# Patient Record
Sex: MALE | Race: OTHER | HISPANIC OR LATINO | Employment: UNEMPLOYED | URBAN - METROPOLITAN AREA
[De-identification: names, ages, dates, MRNs, and addresses within clinical notes are randomized per-mention and may not be internally consistent; named-entity substitution may affect disease eponyms.]

---

## 2018-09-19 ENCOUNTER — HOSPITAL ENCOUNTER (EMERGENCY)
Facility: HOSPITAL | Age: 38
Discharge: HOME | End: 2018-09-19
Attending: EMERGENCY MEDICINE | Admitting: EMERGENCY MEDICINE

## 2018-09-19 VITALS
TEMPERATURE: 97.4 F | RESPIRATION RATE: 16 BRPM | WEIGHT: 150 LBS | DIASTOLIC BLOOD PRESSURE: 82 MMHG | OXYGEN SATURATION: 100 % | HEART RATE: 93 BPM | SYSTOLIC BLOOD PRESSURE: 135 MMHG

## 2018-09-19 DIAGNOSIS — T50.901A ACCIDENTAL OVERDOSE, INITIAL ENCOUNTER: Primary | ICD-10-CM

## 2018-09-19 DIAGNOSIS — F11.10 HEROIN ABUSE (HCC): ICD-10-CM

## 2018-09-19 LAB
AMPHETAMINES SERPL QL SCN: NEGATIVE
BARBITURATES UR QL: NEGATIVE
BENZODIAZ UR QL: NEGATIVE
COCAINE UR QL: NEGATIVE
METHADONE UR QL: NEGATIVE
OPIATES UR QL SCN: POSITIVE
PCP UR QL: NEGATIVE
THC UR QL: POSITIVE

## 2018-09-19 PROCEDURE — 99284 EMERGENCY DEPT VISIT MOD MDM: CPT

## 2018-09-19 PROCEDURE — 80307 DRUG TEST PRSMV CHEM ANLYZR: CPT | Performed by: EMERGENCY MEDICINE

## 2018-09-19 NOTE — ED PROVIDER NOTES
History  Chief Complaint   Patient presents with    Overdose - Accidental     found on street - not breathing with questionable pulse - cpr started by EMS - given  narcan 8mg intranasally by police - admits to heroin 1 bag injected - awake on arrival     62-year-old gentleman presents after an accidental overdose of heroin  Patient has an ongoing history of heroin abuse and went through detox recently  He is currently residing at [facility]  He admits to injecting one bag prior to being found unresponsive  He was given a total of 8 mg of Narcan before completely awakening  At this point he denies any problems, complaints, acute issues  Overdose - Accidental   Ingested substance:  Illicit drugs  Suspected agents: Heroin  Context: recreational    Associated symptoms: altered mental status, diaphoresis (After receiving Narcan) and unresponsiveness    Associated symptoms: no abdominal pain, no agitation, no anxiety, no chest pain, no cough, no diarrhea, no headaches, no lethargy, no nausea, no shortness of breath, no slurred speech and no vomiting        None       Past Medical History:   Diagnosis Date    Drug abuse        History reviewed  No pertinent surgical history  History reviewed  No pertinent family history  I have reviewed and agree with the history as documented  Social History   Substance Use Topics    Smoking status: Current Every Day Smoker     Packs/day: 1 00    Smokeless tobacco: Never Used    Alcohol use No        Review of Systems   Constitutional: Positive for diaphoresis (After receiving Narcan)  Negative for activity change, chills, fatigue and fever  HENT: Negative  Negative for congestion, postnasal drip, rhinorrhea, sinus pain, sore throat and trouble swallowing  Eyes: Negative  Respiratory: Negative  Negative for cough and shortness of breath  Cardiovascular: Negative for chest pain     Gastrointestinal: Negative for abdominal pain, constipation, diarrhea, nausea and vomiting  Endocrine: Negative  Genitourinary: Negative  Musculoskeletal: Negative  Negative for arthralgias, back pain and myalgias  Skin: Negative  Allergic/Immunologic: Negative  Neurological: Negative  Negative for headaches  Hematological: Negative  Psychiatric/Behavioral: Negative for agitation  Physical Exam  Physical Exam   Constitutional: He is oriented to person, place, and time  He appears well-developed and well-nourished  No distress  HENT:   Head: Normocephalic and atraumatic  Nose: Nose normal    Mouth/Throat: Oropharynx is clear and moist    Eyes: Conjunctivae are normal  Pupils are equal, round, and reactive to light  Neck: Neck supple  Cardiovascular: Normal rate, regular rhythm and normal heart sounds  Pulmonary/Chest: Effort normal and breath sounds normal  No respiratory distress  Abdominal: Soft  Bowel sounds are normal  He exhibits no distension  There is no tenderness  There is no guarding  Musculoskeletal: Normal range of motion  He exhibits no edema  Neurological: He is alert and oriented to person, place, and time  Skin: Skin is warm and dry  Capillary refill takes less than 2 seconds  He is not diaphoretic  Psychiatric: He has a normal mood and affect  His behavior is normal    Nursing note and vitals reviewed        Vital Signs  ED Triage Vitals [09/19/18 1330]   Temperature Pulse Respirations Blood Pressure SpO2   (!) 97 4 °F (36 3 °C) (!) 109 20 144/86 100 %      Temp Source Heart Rate Source Patient Position - Orthostatic VS BP Location FiO2 (%)   Temporal Monitor Sitting Left arm --      Pain Score       --           Vitals:    09/19/18 1330 09/19/18 1411   BP: 144/86 135/82   Pulse: (!) 109 93   Patient Position - Orthostatic VS: Sitting Sitting       Visual Acuity      ED Medications  Medications - No data to display    Diagnostic Studies  Results Reviewed     Procedure Component Value Units Date/Time    Rapid drug screen, urine [[id number]]  (Abnormal) Collected:  09/19/18 1350    Lab Status:  Final result Specimen:  Urine from Urine, Clean Catch Updated:  09/19/18 1435     Amph/Meth UR Negative     Barbiturate Ur Negative     Benzodiazepine Urine Negative     Cocaine Urine Negative     Methadone Urine Negative     Opiate Urine Positive (A)     PCP Ur Negative     THC Urine Positive (A)    Narrative:         Presumptive report  If requested, specimen will be sent to reference lab for confirmation  FOR MEDICAL PURPOSES ONLY  IF CONFIRMATION NEEDED PLEASE CONTACT THE LAB WITHIN 5 DAYS  Drug Screen Cutoff Levels:  AMPHETAMINE/METHAMPHETAMINES  1000 ng/mL  BARBITURATES     200 ng/mL  BENZODIAZEPINES     200 ng/mL  COCAINE      300 ng/mL  METHADONE      300 ng/mL  OPIATES      300 ng/mL  PHENCYCLIDINE     25 ng/mL  THC       50 ng/mL                 No orders to display              Procedures  Procedures       Phone Contacts  ED Phone Contact    ED Course                               MDM  Number of Diagnoses or Management Options  Diagnosis management comments: 70-year-old presents after accidental overdose on heroin  Admits to injecting one bag of heroin into his left forearm  Has a history of multiple overdoses and rehab stays in the past   He is currently residing at [facility]  Patient is declining host referral or any other type of workup at this time  He does remain fully awake, alert, oriented      CritCare Time    Disposition  Final diagnoses:   Accidental overdose, initial encounter   Heroin abuse     Time reflects when diagnosis was documented in both MDM as applicable and the Disposition within this note     Time User Action Codes Description Comment    9/19/2018  1:43 PM Tatiana Gonzales [T50 [id number]] Accidental overdose, initial encounter     9/19/2018  1:44 PM Saritha Ibrahim, [id number] [address]ugh [F11 [id number]] Heroin abuse       ED Disposition     ED Disposition Condition Comment    Discharge  [facility] discharge to home/self care     Condition at discharge: Good        Follow-up Information    None         There are no discharge medications for this patient  No discharge procedures on file      ED Provider  Electronically Signed by           Simin Reyes DO  09/23/18 7583

## 2018-09-19 NOTE — DISCHARGE INSTRUCTIONS
Sobredosis en adultos   LO QUE NECESITA SABER:   Manjula sobredosis ocurre cuando usted denis más medicamento que la cantidad neumann que debe verenice  Manjula sobredosis puede ser leve o ser manjula emergencia de steve o Bernie  Es probable que usted se sienta soñoliento, mareado o con náuseas, dependiendo del medicamento que tomó  No se encontraron daños específicos a kelly cuerpo shailesh resultado de kelly sobredosis  Alma síntomas marin disminuido en las últimas 6 a 12 horas  INSTRUCCIONES SOBRE [facility]:   Llame al 911 si usted o Garrie Hof a usted tiene alguno de los siguientes síntomas:   · Kelly malathi es muy pálida y húmeda al tacto  · Kelly cuerpo está flácido o usted no puede hablar  · No lo pueden despertar  · Kelly respiración es más lenta o más rápida de lo normal      · Kelly corazón late más lento de lo normal     · Usted se siente confundido o más cansado que lo habitual, o está sudando más de lo normal     · Usted no puede articular las palabras  · Alma uñas o los labios son de color ely o nelli  Regrese a la marleny de emergencias si:   · Usted tiene náusea y vómito severo  · Usted no puede defecar u orinar  · Kelly piel y la parte mari de alma ojos se vuelven nawaf  Pregúntele a kelly Kwaku Forester vitaminas y minerales son adecuados para usted  · Usted ghassan que kelly medicamento no está funcionando  · Usted tiene náusea, vómito, diarrea o calambres abdominales  · Usted tiene preguntas o inquietudes sobre kelly medicamento  Barry kelly medicamento según alma indicaciones:  Consulte con kelly médico si usted ghassan que kelly medicamento no le está ayudando o si presenta efectos secundarios  No tome más medicamento del que le recetaron  Mantenga alma medicamentos en alma recipientes originales  Mantenga manjula lista actualizada de los [place], las vitaminas y los productos herbales que denis  Incluya los siguientes datos de los medicamentos: cantidad, frecuencia y motivo de administración   No comparta kelly medicamento con [employer]  Prevenga otra sobredosis:   · Giulia las etiquetas cuidadosamente  Giulia las etiquetas de todos  los medicamentos que usted denis  Nunca tome más de lo que dice la etiqueta  Si usted tiene preguntas, pregúntele a ulloa farmaceuta o médico     · No consuma alcohol  Las bebidas alcohólicas aumentan ulloa riesgo de sufrir otra sobredosis  El alcohol puede también disimular síntomas importantes para los que debería hablar con ulloa médico     · No maneje ni opere máquinas  hasta que ulloa médico lo autorice  Estas actividades pueden resultar peligrosas después de manjula sobredosis  · Use precaución si denis más de un medicamento a la vez  Mezclar medicamentos o verenice más de un medicamento a la vez puede ser peligroso  · Dígale a ulloa corwin o amigos qué medicamentos está tomando  Hable con ellos acerca de lo que deben hacer si usted tiene manjula sobredosis  Acuda a alma consultas de control con ulloa médico según le indicaron  Es probable que usted necesite ir donde un consejero o psiquiatra  Anote alma preguntas para que se acuerde de hacerlas julia alma visitas  © 2017 [address]  Information is for End User's use only and may not be sold, redistributed or otherwise used for commercial purposes  All illustrations and images included in CareNotes® are the copyrighted property of A D A M , Inc  or Steven Mena  Esta información es sólo para uso en educación  Ulloa intención no es darle un consejo médico sobre enfermedades o tratamientos  Colsulte con ulloa [place] farmacéutico antes de seguir cualquier régimen médico para saber si es seguro y efectivo para usted  Abuso de los narcóticos   LO QUE NECESITA SABER:   Los narcóticos son medicamentos que se usan para disminuir o quitar el dolor severo  Los narcóticos también podrían ser llamados opioides  Algunos nombres comunes para los narcóticos que son ordenados por ulloa médico son Manjula [place] y la morfina   La heroína es manjula droga ilegal que está hecha de morfina   INSTRUCCIONES SOBRE [facility]:   Regrese a la marleny de emergencias si:   · Usted está muy soñoliento  · Usted no puede articular las palabras  · Usted tiene dificultad para pensar, recordar las cosas o concentrarse  Pregúntele a kelly Kwaku Forester vitaminas y minerales son adecuados para usted  · Usted desea ayuda o información para dejar de usar o abusar de los narcóticos  Acuda a alma consultas de control con kelly médico según le indicaron  Anote alma preguntas para que se acuerde de hacerlas julia alma visitas  Intoxicación por narcóticos  por lo general dura varias horas  Es posible que usted presente lo siguiente julia o después de usar narcóticos:  · Comportamiento anormal o cambios del humor, shailesh sensación de bienestar, y a continuación sentir que no le importa nada ni nadie    · Dificultad para pensar, recordar las cosas o concentrarse    · Pupilas pequeñas    · Somnolencia    · Dificultad para hablar  La abstinencia de narcóticos  ocurre si usted suspende el consumo de narcóticos después de usarlos en grandes cantidades julia un tiempo  Los signos y síntomas pueden comenzar a los pocos minutos o a los días, y pueden continuar julia días o incluso meses:  · Depresión y ansiedad    · Náuseas o vómito    · Elisabet musculares    · Ojos llorosos o flujo nasal    · Pupilas dilatadas    · Sudoración o piel de jonatan    · Diarrea    · Consuelo Janneth o escalofríos    · Dificultad para dormir  Cómo pueden causar daño los narcóticos a manjula cherri [place] y a kelly bebé:   · Informe a kelly médico de inmediato si está tratando de quedar embarazada o si está [place] y [place] narcóticos  Es posible que kelly médico le sugiera otros medicamentos para controlar el dolor y evitar que tenga síntomas de abstinencia  Es posible que tenga un aborto espontáneo o que kelly bebé nazca muerto  También podría nacer muy pequeño y tener problemas médicos      · Cuando nazca kelly bebé, podría mostrar signos de abstinencia  Algunos de estos signos son la pérdida inesperada de [place], dificultad para alimentarse y llorar más de lo normal  Es posible también que ulloa bebé tenga fiebre, vómito y [place]  Podría tener [employer] de aprendizaje y [de-identified] problemas de karina cuando sea [address]  Si usted tiene un bebé y [place] narcóticos, es posible que tenga dificultad para cuidar a ulloa bebé  Los narcóticos se podrían transmitir al bebé a través de la [place]  Si está usando narcóticos, hable con ulloa médico antes de amamantar a ulloa bebé  Para [de-identified] y más información:   · Substance Abuse and [facility] , [address] [place]  Web Address: https://[employer]/  · [facility] on Drug Abuse  [address] [place] [address]  Phone: 6- 121 - 128-9360  Web Address: www dulce nih gov  © 2017 [facility] Information is for End User's use only and may not be sold, redistributed or otherwise used for commercial purposes  All illustrations and images included in CareNotes® are the copyrighted property of A D A M , Inc  or Steven Mena  Esta información es sólo para uso en educación  Ulloa intención no es darle un consejo médico sobre enfermedades o tratamientos  Colsulte con ulloa [place] farmacéutico antes de seguir cualquier régimen médico para saber si es seguro y efectivo para usted

## 2018-09-19 NOTE — ED NOTES
Christiano Storm DO, recommended the patient be evaluated by [facility]OST; however, the patient states he is not interested  States he will return to [employer] under disciplinary status

## 2018-09-19 NOTE — ED NOTES
Pt able to stay awake and understands why we are keeping him here to watch him     Rony Townsend, ANGELITA  09/19/18 2852

## 2018-09-19 NOTE — ED NOTES
Pt told ED tech that he will not give urine sample and that he does not want any treatment and he wants to leave     Martín Nicole RN  09/19/18 6002

## 2020-03-23 ENCOUNTER — APPOINTMENT (EMERGENCY)
Dept: RADIOLOGY | Facility: HOSPITAL | Age: 40
End: 2020-03-23

## 2020-03-23 ENCOUNTER — HOSPITAL ENCOUNTER (EMERGENCY)
Facility: HOSPITAL | Age: 40
Discharge: HOME | End: 2020-03-23
Attending: EMERGENCY MEDICINE | Admitting: EMERGENCY MEDICINE
Payer: COMMERCIAL

## 2020-03-23 VITALS
WEIGHT: 149.91 LBS | SYSTOLIC BLOOD PRESSURE: 107 MMHG | DIASTOLIC BLOOD PRESSURE: 62 MMHG | HEART RATE: 132 BPM | TEMPERATURE: 97.9 F | RESPIRATION RATE: 20 BRPM | OXYGEN SATURATION: 100 %
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
## 2020-03-23 PROCEDURE — 99284 EMERGENCY DEPT VISIT MOD MDM: CPT | Performed by: EMERGENCY MEDICINE

## 2020-03-23 PROCEDURE — 99284 EMERGENCY DEPT VISIT MOD MDM: CPT

## 2020-03-23 RX ORDER — DIAZEPAM 5 MG/ML
5 INJECTION, SOLUTION INTRAMUSCULAR; INTRAVENOUS ONCE
Status: DISCONTINUED | OUTPATIENT
Start: 2020-03-23 | End: 2020-03-23

## 2020-03-23 RX ORDER — DIAZEPAM 5 MG/1
5 TABLET ORAL ONCE
Status: COMPLETED | OUTPATIENT
Start: 2020-03-23 | End: 2020-03-23

## 2020-03-23 RX ADMIN — DIAZEPAM 5 MG: 5 TABLET ORAL at 20:02

## 2020-03-23 NOTE — ED PROVIDER NOTES
History  Chief Complaint   Patient presents with    Fall     pt arrives by [employer] after faling down unknown amount of stairs  pt reports left lower back pain  Patient is a 24-year-old male brought in by police after patient was found on a roof throwing objects at cars and people on street  The owner of the building called the police  Patient came down willingly and admitted to injecting heroin injecting and a gram of cocaine  Patient states that he was having a bad trip and that he believes people were following him that is why he was on the roof  He states that he was running down the stairs, his shoes were slippery from the rain and he fell down approximately 3 flights of stairs  He denies hitting his head or losing consciousness  He has pain to his left shoulder, bilateral knees and left low back        History provided by:  Patient   used: No    Fall   Mechanism of injury: fall    Injury location:  Torso  Torso injury location:  Back  Incident location:  Home  Arrived directly from scene: yes    Fall:     Fall occurred:  Down stairs    Height of fall:  Standing    Impact surface:  Unable to specify    Point of impact:  Unable to specify    Entrapped after fall: no    Protective equipment: none    Suspicion of alcohol use: yes    Suspicion of drug use: yes    Tetanus status:  Unknown  Prior to arrival data:     Bystander interventions:  None    Patient ambulatory at scene: no      Blood loss:  None    Responsiveness at scene:  Alert    Orientation at scene:  Person, place, situation and time    Loss of consciousness: no      Amnesic to event: no      Airway interventions:  None    Breathing interventions:  None    IV access status:  None    Fluids administered:  None    Cardiac interventions:  None    Medications administered:  None    Immobilization:  None    Airway condition since incident:  Stable    Breathing condition since incident:  Stable    Circulation condition since incident: Stable    Mental status condition since incident:  Stable    Disability condition since incident:  Stable  Associated symptoms: back pain    Associated symptoms: no abdominal pain, no blindness, no chest pain, no difficulty breathing, no headaches, no hearing loss, no loss of consciousness, no nausea, no neck pain, no seizures and no vomiting    Risk factors: no AICD, no anticoagulation therapy, no asthma, no beta blocker therapy, no CABG, no CAD, no CHF, no COPD, no diabetes, no dialysis, no hemophilia, no kidney disease, no pacemaker, no past MI and no steroid use        None       Past Medical History:   Diagnosis Date    Drug abuse ([facility] [address] )        History reviewed  No pertinent surgical history  History reviewed  No pertinent family history  I have reviewed and agree with the history as documented  E-Cigarette/Vaping    E-Cigarette Use Never User      E-Cigarette/Vaping Substances     Social History     Tobacco Use    Smoking status: Current Every Day Smoker     Packs/day: 0 50    Smokeless tobacco: Never Used   Substance Use Topics    Alcohol use: No    Drug use: Yes     Types: Heroin, Cocaine     Comment: IV cocaine today (1gram)       Review of Systems   Constitutional: Negative  Negative for diaphoresis and fever  HENT: Negative  Negative for ear pain, hearing loss and sore throat  Eyes: Negative  Negative for blindness and visual disturbance  Respiratory: Negative  Negative for chest tightness and shortness of breath  Cardiovascular: Negative  Negative for chest pain and palpitations  Gastrointestinal: Negative  Negative for abdominal pain, nausea and vomiting  Genitourinary: Negative  Negative for difficulty urinating and dysuria  Musculoskeletal: Positive for back pain  Negative for neck pain  Skin: Negative for rash  Neurological: Negative for seizures, loss of consciousness, weakness and headaches  Hematological: Negative  Psychiatric/Behavioral: Negative  Negative for confusion  All other systems reviewed and are negative  Physical Exam  Physical Exam   Constitutional: He is oriented to person, place, and time  He appears well-developed and well-nourished  No distress  HENT:   Head: Normocephalic and atraumatic  Mouth/Throat: Oropharynx is clear and moist    Patient is maintaining airway maintaining secretions   Eyes: Pupils are equal, round, and reactive to light  Conjunctivae and EOM are normal    Neck: Normal range of motion  Neck supple  Cardiovascular: Normal rate, regular rhythm, normal heart sounds and intact distal pulses  No murmur heard  Pulmonary/Chest: Effort normal and breath sounds normal  No stridor  No respiratory distress  Abdominal: Soft  Bowel sounds are normal  He exhibits no distension  There is no tenderness  Musculoskeletal: Normal range of motion  He exhibits no edema  Left shoulder: He exhibits tenderness  He exhibits normal range of motion, no bony tenderness, no swelling, no effusion, no crepitus, no deformity, no laceration, no pain, no spasm, normal pulse and normal strength  Cervical back: Normal         Thoracic back: Normal         Back:    Patient has full active range of motion of the bilateral upper extremities and lower extremities moves them independently of each other and painfree with MM grade 5/5  Neurological: He is alert and oriented to person, place, and time  No cranial nerve deficit  GCS eye subscore is 4  GCS verbal subscore is 5  GCS motor subscore is 6  GCS 15  No slurred speech  No facial asymmetry  No ataxia     Skin: Skin is warm  Capillary refill takes less than 2 seconds  He is not diaphoretic  Nursing note and vitals reviewed        Vital Signs  ED Triage Vitals [03/23/20 1939]   Temperature Pulse Respirations Blood Pressure SpO2   97 9 °F (36 6 °C) (!) 132 20 107/62 100 %      Temp Source Heart Rate Source Patient Position - Orthostatic VS BP Location FiO2 (%)   Tympanic Monitor Sitting Left arm --      Pain Score       --           Vitals:    03/23/20 1939   BP: 107/62   Pulse: (!) 132   Patient Position - Orthostatic VS: Sitting         Visual Acuity      ED Medications  Medications   diazepam (VALIUM) tablet 5 mg (5 mg Oral Given 3/23/20 2002)       Diagnostic Studies  Results Reviewed     None                 No orders to display              Procedures  Procedures         ED Course  ED Course as of Mar 23 2016   Mon Mar 23, 2020   1925 Patient is a 56-year-old male coming in with police after patient fell down several stairs after he was throwing things at [employer] people in the street this patient does injected heroin and cocaine  On exam patient is tachycardic and hallucinating  He does have tenderness to the left lumbar spine without any evidence of external injuries  He does have abrasions to bilateral knees  Will obtain labs, CTs and x-rays of bilateral knees  Also give Valium    Portions of the record may have been created with voice recognition software  Occasional wrong word or "sound a like" substitutions may have occurred due to the inherent limitations of voice recognition software  Read the chart carefully and recognize, using context, where substitutions have occurred  2005 Call to room as patient was given Valium  He states that he does not want this but he wants something stronger for pain  I discussed with him that we need further imaging as well as labs  Patient ambulating around room states "I came here because I wanted something stronger for pain"  I discussed with him that we keep him Tylenol or Motrin for his pain but nothing else at this time  He is already taking cocaine and heroin    He is refusing to stay for further imaging    Discussed with him signing out AMA and the risk of signing out AMA including but not limited to:  Pneumothorax, hemothorax, death, hemo pneumothorax, splenic injury, liver injury, mi, PE, stroke, loss of limb, fracture, death, spinal injury  Patient states she just wants a S to know that he does want anyone else no without any left  Patient signed out AMA and is ambulating around the room in no distress                                    MDM      Disposition  Final diagnoses:   Fall, initial encounter     Time reflects when diagnosis was documented in both MDM as applicable and the Disposition within this note     Time User Action Codes Description Comment    3/23/2020  8:09 PM Lashawn Restrepo Add [M29  Meenakshi Kumar, initial encounter       ED Disposition     ED Disposition Condition Date/Time Comment    AMA  Mon Mar 23, 2020  8:09 PM Date: 3/23/2020  Patient: Kem Nix  Admitted: 3/23/2020  7:21 PM  Attending Provider: Mik Barcenas DO    Kem Nix or his authorized caregiver has made the decision for the patient to leave the emergency department against the advice of  the emergency department staff  He or his authorized caregiver has been informed and understands the inherent risks, including death, hemothorax, pneumothorax, hemopneumothorax, fracture, spinal injury, liver injury, splenic injury, MI, PE, head inj ury, intracranial hemorrhage  He or his authorized caregiver has decided to accept the responsibility for this decision  Kem Nix and all necessary parties have been advised that he may return for further evaluation or treatment  His condition  at time of discharge was stable   Kem Nix had current vital signs as follows:  /62 (BP Location: Left arm)   Pulse (!) 132   Temp 97 9 °F (36 6 °C) (Tympanic)   Resp 20   Wt 68 kg (149 lb 14 6 oz)         Follow-up Information    None         Patient's Medications    No medications on file     No discharge procedures on file      PDMP Review     None          ED Provider  Electronically Signed by           Mik Barcenas DO  03/23/20 2017

## 2020-03-24 NOTE — ED NOTES
Pt  Now refusing IV/Labwork/further testing    Dr Kim August made aware & into speak w/ pt    Mikki Townsend RN  03/23/20 2008

## 2020-03-24 NOTE — ED NOTES
Patient found in back nourishment area rummaging through refrigerator, patient eating a sandwich and had other snack in hand  Patient ambulated out of ED        Percy Cedeño RN  03/23/20 2046

## 2020-04-01 ENCOUNTER — HOSPITAL ENCOUNTER (EMERGENCY)
Facility: HOSPITAL | Age: 40
Discharge: HOME | End: 2020-04-01
Attending: EMERGENCY MEDICINE | Admitting: EMERGENCY MEDICINE
Payer: COMMERCIAL

## 2020-04-01 VITALS
TEMPERATURE: 99.1 F | HEART RATE: 86 BPM | RESPIRATION RATE: 18 BRPM | OXYGEN SATURATION: 99 % | WEIGHT: 139.55 LBS | SYSTOLIC BLOOD PRESSURE: 133 MMHG | DIASTOLIC BLOOD PRESSURE: 74 MMHG

## 2020-04-01 DIAGNOSIS — F22 PARANOIA (HCC): ICD-10-CM

## 2020-04-01 DIAGNOSIS — F19.10 DRUG ABUSE (HCC): ICD-10-CM

## 2020-04-01 DIAGNOSIS — F29 PSYCHOSIS (HCC): Primary | ICD-10-CM

## 2020-04-01 LAB
AMPHETAMINES SERPL QL SCN: POSITIVE
BARBITURATES UR QL: NEGATIVE
BENZODIAZ UR QL: NEGATIVE
COCAINE UR QL: POSITIVE
ETHANOL EXG-MCNC: 0 MG/DL
METHADONE UR QL: NEGATIVE
OPIATES UR QL SCN: POSITIVE
PCP UR QL: NEGATIVE
THC UR QL: NEGATIVE

## 2020-04-01 PROCEDURE — 90715 TDAP VACCINE 7 YRS/> IM: CPT | Performed by: EMERGENCY MEDICINE

## 2020-04-01 PROCEDURE — 80307 DRUG TEST PRSMV CHEM ANLYZR: CPT | Performed by: EMERGENCY MEDICINE

## 2020-04-01 PROCEDURE — 90471 IMMUNIZATION ADMIN: CPT

## 2020-04-01 PROCEDURE — 99284 EMERGENCY DEPT VISIT MOD MDM: CPT

## 2020-04-01 PROCEDURE — 99285 EMERGENCY DEPT VISIT HI MDM: CPT | Performed by: EMERGENCY MEDICINE

## 2020-04-01 PROCEDURE — 12004 RPR S/N/AX/GEN/TRK7.6-12.5CM: CPT | Performed by: EMERGENCY MEDICINE

## 2020-04-01 PROCEDURE — 82075 ASSAY OF BREATH ETHANOL: CPT | Performed by: EMERGENCY MEDICINE

## 2020-04-01 RX ORDER — RISPERIDONE 1 MG/1
1 TABLET, ORALLY DISINTEGRATING ORAL ONCE
Status: COMPLETED | OUTPATIENT
Start: 2020-04-01 | End: 2020-04-01

## 2020-04-01 RX ORDER — LORAZEPAM 0.5 MG/1
1 TABLET ORAL ONCE
Status: COMPLETED | OUTPATIENT
Start: 2020-04-01 | End: 2020-04-01

## 2020-04-01 RX ADMIN — RISPERIDONE 1 MG: 1 TABLET, ORALLY DISINTEGRATING ORAL at 18:00

## 2020-04-01 RX ADMIN — LORAZEPAM 1 MG: 0.5 TABLET ORAL at 16:58

## 2020-04-01 RX ADMIN — TETANUS TOXOID, REDUCED DIPHTHERIA TOXOID AND ACELLULAR PERTUSSIS VACCINE, ADSORBED 0.5 ML: 5; 2.5; 8; 8; 2.5 SUSPENSION INTRAMUSCULAR at 14:40

## 2020-10-14 ENCOUNTER — HOSPITAL ENCOUNTER (EMERGENCY)
Facility: HOSPITAL | Age: 40
Discharge: HOME | End: 2020-10-14
Attending: EMERGENCY MEDICINE | Admitting: EMERGENCY MEDICINE
Payer: COMMERCIAL

## 2020-10-14 VITALS
RESPIRATION RATE: 16 BRPM | HEART RATE: 91 BPM | SYSTOLIC BLOOD PRESSURE: 131 MMHG | TEMPERATURE: 95.5 F | WEIGHT: 171.25 LBS | OXYGEN SATURATION: 97 % | DIASTOLIC BLOOD PRESSURE: 70 MMHG

## 2020-10-14 DIAGNOSIS — U07.1 COVID-19 VIRUS INFECTION: ICD-10-CM

## 2020-10-14 DIAGNOSIS — R52 GENERALIZED BODY ACHES: Primary | ICD-10-CM

## 2020-10-14 PROCEDURE — 99283 EMERGENCY DEPT VISIT LOW MDM: CPT

## 2020-10-14 PROCEDURE — 99284 EMERGENCY DEPT VISIT MOD MDM: CPT | Performed by: PHYSICIAN ASSISTANT

## 2020-10-14 RX ORDER — IBUPROFEN 600 MG/1
600 TABLET ORAL ONCE
Status: COMPLETED | OUTPATIENT
Start: 2020-10-14 | End: 2020-10-14

## 2020-10-14 RX ORDER — ACETAMINOPHEN 325 MG/1
650 TABLET ORAL ONCE
Status: COMPLETED | OUTPATIENT
Start: 2020-10-14 | End: 2020-10-14

## 2020-10-14 RX ADMIN — IBUPROFEN 600 MG: 600 TABLET ORAL at 19:33

## 2020-10-14 RX ADMIN — ACETAMINOPHEN 650 MG: 325 TABLET ORAL at 19:32
